# Patient Record
(demographics unavailable — no encounter records)

---

## 2025-02-20 NOTE — REASON FOR VISIT
[Symptom and Test Evaluation] : symptom and test evaluation [FreeTextEntry1] : 53F comes in complaining of chest pain. The discomfort is midsternal. Symptoms noted at rest and with activity. Occasional dyspnea and fatigue noted. No syncope noted. EKG is remarkable for non-specific st t abnormalities. No prior cardiac work up.

## 2025-02-20 NOTE — DISCUSSION/SUMMARY
[FreeTextEntry1] : CP/abnormal ekg check stress echo if able to approve and schedule. EKG section of the chart --- secondary to symptoms above an electrocardiogram also known as an EKG was performed.  Risks and benefits discussed with the patient. Patient was given time and privacy to changed into a gown. Shortly after, standard 10 leads were applied and a [employer] system was used to perform the study. The results were subsequently reviewed by attending physician and discussed with the patient. The study showed a normal sinus rhythm and no ST-T suggestive of ischemia. Order for the EKG was placed in the chart. The results were documented. Billing submitted. Borderline HTN - MURALI  and I had an extensive discussion regarding her blood pressure management. Patient will continue taking current medications in addition to maintaining a low Na diet, with periodic b/p checks at home. Psychiatry portion of the chart  it has been an initiative of [facility] to do mental health screening in our outpatient practices. It was lona to my attention that this patient noted to be positive on this screening. Please note, that this is a cardiology sessional as a part of out-patient internal medicine practice. As a cardiologist, I am not trained to provide mental health care or medication management nor do I have a psychiatrist that I can contact that can see patient urgently as an outpatient. [EKG obtained to assist in diagnosis and management of assessed problem(s)] : EKG obtained to assist in diagnosis and management of assessed problem(s)

## 2025-02-20 NOTE — DISCUSSION/SUMMARY
[FreeTextEntry1] : CP/abnormal ekg check stress echo if able to approve and schedule. EKG section of the chart --- secondary to symptoms above an electrocardiogram also known as an EKG was performed.  Risks and benefits discussed with the patient. Patient was given time and privacy to changed into a gown. Shortly after, standard 10 leads were applied and a [place] system was used to perform the study. The results were subsequently reviewed by attending physician and discussed with the patient. The study showed a normal sinus rhythm and no ST-T suggestive of ischemia. Order for the EKG was placed in the chart. The results were documented. Billing submitted. Borderline HTN - MURALI  and I had an extensive discussion regarding her blood pressure management. Patient will continue taking current medications in addition to maintaining a low Na diet, with periodic b/p checks at home. Psychiatry portion of the chart  it has been an initiative of [facility] to do mental health screening in our outpatient practices. It was lona to my attention that this patient noted to be positive on this screening. Please note, that this is a cardiology sessional as a part of out-patient internal medicine practice. As a cardiologist, I am not trained to provide mental health care or medication management nor do I have a psychiatrist that I can contact that can see patient urgently as an outpatient. [EKG obtained to assist in diagnosis and management of assessed problem(s)] : EKG obtained to assist in diagnosis and management of assessed problem(s)

## 2025-07-01 NOTE — HISTORY OF PRESENT ILLNESS
[de-identified] : 53-year-old female presents today with bilateral knee pain left greater than right.  Pain initially started a couple months ago the patient states that she notices something slipping in her knee that almost feels like a ball.  No specific inciting event or trauma.  She states that it feels like something is popping out of her knee that needs to be cracked.  No falls.  She states that she seen orthopedics prior in the past and had a corticosteroid injection into the both knees.

## 2025-07-01 NOTE — HISTORY OF PRESENT ILLNESS
[de-identified] : 53-year-old female presents today with bilateral knee pain left greater than right.  Pain initially started a couple months ago the patient states that she notices something slipping in her knee that almost feels like a ball.  No specific inciting event or trauma.  She states that it feels like something is popping out of her knee that needs to be cracked.  No falls.  She states that she seen orthopedics prior in the past and had a corticosteroid injection into the both knees.

## 2025-07-01 NOTE — DISCUSSION/SUMMARY
[de-identified] : 53-year-old female presents today with bilateral knee pain left greater than right.  On examination specifically pertaining to her left knee inferomedial aspect close the Pes anserine bursa there is a freely mobile cystic-like mass of unknown etiology.  There is also a similar type of mass located on the right knee more localized to the inferior lateral aspect.  Patient states that he first noticed this a couple of weeks ago and is unsure as to how they first appeared.  He states the knee feels like it is unstable.  At this time I would like to order MRIs of the bilateral knees for further evaluation.  X-rays are nondiagnostic at this time.  Follow-up after the MRIs for definitive treatment plan.    This office visit included some or all of the following of both face-to-face time (preparation for visit-reviewing prior notes, performing H&P, ordering of medications, tests/ performing procedures, and counseling/education to the patient/family) and non-face-to-face time (deciding on recommendations to patients, independently interpreting tests, documentation in the EMR, communicating with other providers before or during the visit).    I have spent a total time of 45 minutes on this patient encounter on the same day of 7/1/2025.

## 2025-07-01 NOTE — PHYSICAL EXAM
[de-identified] : (LEFT) KNEE   INSPECTION Appearance: No erythema, gross deformities or malalignment Effusion: None Bursa swelling: None Palpation there is a mobile, soft cystic-like mass that is palpable in the area of the pes anserine bursa  PALPATION  Medial joint line: None Lateral joint line: None Medial retinaculum: None Lateral retinaculum: None Medial Tibial Plateau: None Lateral Tibial Plateau: None Medial Femoral Condyle: None Lateral Femoral Condyle: None Tibial Tuberosity: None MCL: None LCL: None Patellar tendon: None Quadriceps tendon: None ITB at lateral femoral condyle: None ITB at Gerdy's tubercle: None Fibular head: None Pes anserine: None  Crepitus: None Defect: None Popliteal fullness: Negative  ROM Active Flexion: Full 0-140 Passive Flexion: Full Extension: Full SLR (assessing quad/patella tendon function)-able to perform  MOTOR STRENGTH Flexion: 5/5  Extension: 5/5   SENSORY INDEX Normal  PATELLOFEMORAL Patellar grind test: Negative Patellar apprehension: Negative J-sign: Negative Double leg squat: Able to perform Single leg-squat: Able to perform  ACL/PCL  Lachman test: Stable Anterior drawer: Stable Posterior drawer: Stable Dial Test: Stable  MCL/LCL  MCL Valgus laxity: Stable LCL Varus laxity: Stable  MENISCUS Reji's (positive for pain, snapping, audible clicking, locking) Medial Meniscus (full knee flexion->ER tibia->Valgus): Negative Lateral Meniscus (full knee flexion->IR tibia->Varus): Negative Thessalys: Negative  IT Band tests Malacrae's: Negative Obers: Negative  (RIGHT) KNEE  INSPECTION Appearance: No erythema, gross deformities or malalignment Effusion: None Bursa swelling: None Female with palpable small cystic-like mass in the posterior lateral knee  PALPATION Medial joint line: None Lateral joint line: None Medial retinaculum: None Lateral retinaculum: None Medial Tibial Plateau: None Lateral Tibial Plateau: None Medial Femoral Condyle: None Lateral Femoral Condyle: None Tibial Tuberosity: None MCL: None LCL: None Patellar tendon: None Quadriceps tendon: None ITB at lateral femoral condyle: None ITB at Gerdy's tubercle: None Fibular head: None Pes anserine: None  Crepitus: None Defect: None Popliteal fullness: Negative  ROM Active Flexion: Full 0-140 Passive Flexion: Full 0-140 Extension: Full SLR (assessing quad/patella tendon function)-able to perform  MOTOR STRENGTH Flexion: 5/5  Extension: 5/5   SENSORY INDEX Normal  PATELLOFEMORAL  Patellar grind test: Negative Patellar apprehension: Negative J-sign: Negative Double leg squat: Able to perform Single leg-squat: Able to perform  ACL/PCL Lachman test: Stable Anterior drawer: Stable Posterior drawer: Stable Dial Test: Stable  MCL/LCL  MCL Valgus laxity: Stable LCL Varus laxity: Stable  MENISCUS Reji's (positive for pain, snapping, audible clicking, locking) Medial Meniscus (full knee flexion->ER tibia->Valgus): Negative Lateral Meniscus (full knee flexion->IR tibia->Varus): Negative Thessalys: Negative  IT Band tests Malacrae's: Negative Obers: Negative [de-identified] : XR of Date: 7/1/2025 Indication: Left/Right Knee Pain Views: 4-Weightbearing AP, Lateral, Cesar Dasberg Performed at [facility]: Yes  Impression: 4 views of the left knee were obtained today that show no fracture, or dislocation. Well preserved joint spaces. There are no degenerative changes seen.  There is no malalignment.  No obvious osseous abnormality.  4 views of the right knee were obtained today that show no fracture, or dislocation. Well preserved joint spaces. There are no degenerative changes seen.  There is no malalignment.  No obvious osseous abnormality.  The radiographs discussed were ordered and read by me during this patient's visit.  I reviewed each radiograph detail with the patient discussed the findings highlighted in the Impression.

## 2025-07-01 NOTE — DISCUSSION/SUMMARY
[de-identified] : 53-year-old female presents today with bilateral knee pain left greater than right.  On examination specifically pertaining to her left knee inferomedial aspect close the Pes anserine bursa there is a freely mobile cystic-like mass of unknown etiology.  There is also a similar type of mass located on the right knee more localized to the inferior lateral aspect.  Patient states that he first noticed this a couple of weeks ago and is unsure as to how they first appeared.  He states the knee feels like it is unstable.  At this time I would like to order MRIs of the bilateral knees for further evaluation.  X-rays are nondiagnostic at this time.  Follow-up after the MRIs for definitive treatment plan.    This office visit included some or all of the following of both face-to-face time (preparation for visit-reviewing prior notes, performing H&P, ordering of medications, tests/ performing procedures, and counseling/education to the patient/family) and non-face-to-face time (deciding on recommendations to patients, independently interpreting tests, documentation in the EMR, communicating with other providers before or during the visit).    I have spent a total time of 45 minutes on this patient encounter on the same day of 7/1/2025.

## 2025-07-01 NOTE — PHYSICAL EXAM
[de-identified] : (LEFT) KNEE   INSPECTION Appearance: No erythema, gross deformities or malalignment Effusion: None Bursa swelling: None Palpation there is a mobile, soft cystic-like mass that is palpable in the area of the pes anserine bursa  PALPATION  Medial joint line: None Lateral joint line: None Medial retinaculum: None Lateral retinaculum: None Medial Tibial Plateau: None Lateral Tibial Plateau: None Medial Femoral Condyle: None Lateral Femoral Condyle: None Tibial Tuberosity: None MCL: None LCL: None Patellar tendon: None Quadriceps tendon: None ITB at lateral femoral condyle: None ITB at Gerdy's tubercle: None Fibular head: None Pes anserine: None  Crepitus: None Defect: None Popliteal fullness: Negative  ROM Active Flexion: Full 0-140 Passive Flexion: Full Extension: Full SLR (assessing quad/patella tendon function)-able to perform  MOTOR STRENGTH Flexion: 5/5  Extension: 5/5   SENSORY INDEX Normal  PATELLOFEMORAL Patellar grind test: Negative Patellar apprehension: Negative J-sign: Negative Double leg squat: Able to perform Single leg-squat: Able to perform  ACL/PCL  Lachman test: Stable Anterior drawer: Stable Posterior drawer: Stable Dial Test: Stable  MCL/LCL  MCL Valgus laxity: Stable LCL Varus laxity: Stable  MENISCUS Reji's (positive for pain, snapping, audible clicking, locking) Medial Meniscus (full knee flexion->ER tibia->Valgus): Negative Lateral Meniscus (full knee flexion->IR tibia->Varus): Negative Thessalys: Negative  IT Band tests Malacrae's: Negative Obers: Negative  (RIGHT) KNEE  INSPECTION Appearance: No erythema, gross deformities or malalignment Effusion: None Bursa swelling: None Female with palpable small cystic-like mass in the posterior lateral knee  PALPATION Medial joint line: None Lateral joint line: None Medial retinaculum: None Lateral retinaculum: None Medial Tibial Plateau: None Lateral Tibial Plateau: None Medial Femoral Condyle: None Lateral Femoral Condyle: None Tibial Tuberosity: None MCL: None LCL: None Patellar tendon: None Quadriceps tendon: None ITB at lateral femoral condyle: None ITB at Gerdy's tubercle: None Fibular head: None Pes anserine: None  Crepitus: None Defect: None Popliteal fullness: Negative  ROM Active Flexion: Full 0-140 Passive Flexion: Full 0-140 Extension: Full SLR (assessing quad/patella tendon function)-able to perform  MOTOR STRENGTH Flexion: 5/5  Extension: 5/5   SENSORY INDEX Normal  PATELLOFEMORAL  Patellar grind test: Negative Patellar apprehension: Negative J-sign: Negative Double leg squat: Able to perform Single leg-squat: Able to perform  ACL/PCL Lachman test: Stable Anterior drawer: Stable Posterior drawer: Stable Dial Test: Stable  MCL/LCL  MCL Valgus laxity: Stable LCL Varus laxity: Stable  MENISCUS Reji's (positive for pain, snapping, audible clicking, locking) Medial Meniscus (full knee flexion->ER tibia->Valgus): Negative Lateral Meniscus (full knee flexion->IR tibia->Varus): Negative Thessalys: Negative  IT Band tests Malacrae's: Negative Obers: Negative [de-identified] : XR of Date: 7/1/2025 Indication: Left/Right Knee Pain Views: 4-Weightbearing AP, Lateral, Cesar Dasberg Performed at [facility]: Yes  Impression: 4 views of the left knee were obtained today that show no fracture, or dislocation. Well preserved joint spaces. There are no degenerative changes seen.  There is no malalignment.  No obvious osseous abnormality.  4 views of the right knee were obtained today that show no fracture, or dislocation. Well preserved joint spaces. There are no degenerative changes seen.  There is no malalignment.  No obvious osseous abnormality.  The radiographs discussed were ordered and read by me during this patient's visit.  I reviewed each radiograph detail with the patient discussed the findings highlighted in the Impression.